# Patient Record
Sex: MALE | Race: WHITE | URBAN - METROPOLITAN AREA
[De-identification: names, ages, dates, MRNs, and addresses within clinical notes are randomized per-mention and may not be internally consistent; named-entity substitution may affect disease eponyms.]

---

## 2023-11-10 ENCOUNTER — PHARMACY VISIT (OUTPATIENT)
Dept: PHARMACY | Facility: CLINIC | Age: 38
End: 2023-11-10
Payer: MEDICAID

## 2023-11-10 PROCEDURE — RXMED WILLOW AMBULATORY MEDICATION CHARGE

## 2023-11-10 RX ORDER — ONDANSETRON 4 MG/1
TABLET, ORALLY DISINTEGRATING ORAL
Qty: 8 TABLET | Refills: 0 | OUTPATIENT
Start: 2023-11-10

## 2023-11-10 RX ORDER — TRAZODONE HYDROCHLORIDE 50 MG/1
TABLET ORAL
Qty: 10 TABLET | Refills: 0 | OUTPATIENT
Start: 2023-11-10

## 2023-11-10 RX ORDER — HYDROXYZINE HYDROCHLORIDE 50 MG/1
TABLET, FILM COATED ORAL
Qty: 30 TABLET | Refills: 0 | OUTPATIENT
Start: 2023-11-10

## 2023-11-12 ENCOUNTER — PHARMACY VISIT (OUTPATIENT)
Dept: PHARMACY | Facility: CLINIC | Age: 38
End: 2023-11-12
Payer: MEDICAID

## 2023-11-12 PROCEDURE — RXMED WILLOW AMBULATORY MEDICATION CHARGE

## 2023-11-12 RX ORDER — HYDROCHLOROTHIAZIDE 25 MG/1
25 TABLET ORAL DAILY
Qty: 7 TABLET | Refills: 0 | OUTPATIENT
Start: 2023-11-12

## 2024-01-11 ENCOUNTER — HOSPITAL ENCOUNTER (EMERGENCY)
Age: 39
Discharge: HOME | End: 2024-01-11
Attending: EMERGENCY MEDICINE
Payer: COMMERCIAL

## 2024-01-11 VITALS
TEMPERATURE: 97 F | WEIGHT: 315 LBS | RESPIRATION RATE: 16 BRPM | HEART RATE: 98 BPM | SYSTOLIC BLOOD PRESSURE: 175 MMHG | OXYGEN SATURATION: 94 % | DIASTOLIC BLOOD PRESSURE: 132 MMHG

## 2024-01-11 DIAGNOSIS — I10 HYPERTENSION, UNSPECIFIED TYPE: Primary | ICD-10-CM

## 2024-01-11 PROCEDURE — 99283 EMERGENCY DEPT VISIT LOW MDM: CPT

## 2024-01-11 PROCEDURE — 93005 ELECTROCARDIOGRAM TRACING: CPT

## 2024-01-11 ASSESSMENT — ENCOUNTER SYMPTOMS
NAUSEA: 0
SHORTNESS OF BREATH: 0
VOMITING: 0
ABDOMINAL PAIN: 0

## 2024-01-11 NOTE — ED NOTES
PCP appt scheduled for pt. Spoke with [facility] and they are okay with any day and time as long as it is during business hours so a  can take him. Called housing and they are going to come and pick pt up.

## 2024-01-11 NOTE — DISCHARGE INSTRUCTIONS
You were seen in the emergency department for high blood pressure.  Given that you are not having any symptoms, there is no indication to drop your blood pressure.  There is a risk of precipitating a stroke if your blood pressure is lowered quickly.  Continue taking your medications as prescribed.  Take your medications at the same time every day.  Clonidine specifically can cause rebound high blood pressure if not taken at the same time every day.    Return to the emergency department if you are having any chest pain, shortness of breath, vision changes, headache, new weakness or numbness, or any other acute medical concern.

## 2024-01-11 NOTE — ED PROVIDER NOTES
Note Started: 4:26 PM EST         [facility]     Emergency Department     Faculty Attestation    I performed a history and physical examination of the patient and discussed management with the resident. I reviewed the resident’s note and agree with the documented findings and plan of care. Any areas of disagreement are noted on the chart. I was personally present for the key portions of any procedures. I have documented in the chart those procedures where I was not present during the key portions. I have reviewed the emergency nurses triage note. I agree with the chief complaint, past medical history, past surgical history, allergies, medications, social and family history as documented unless otherwise noted below.        For Physician Assistant/ Nurse Practitioner cases/documentation I have personally evaluated this patient and have completed at least one if not all key elements of the E/M (history, physical exam, and MDM). Additional findings are as noted.  I have personally seen and evaluated the patient.  I find the patient's history and physical exam are consistent with the NP/PA documentation.  I agree with the care provided, treatment rendered, disposition and follow-up plan.    38-year-old male sent in from [facility] rehabilitation for hypertension.  Had blood pressure taken today and was significantly elevated.  Skipped his clonidine dose yesterday.  Denies any symptoms including no chest pain, no shortness of breath, no exertional fatigue, no headache, no vision changes, no floaters.  He does not have a PCP currently.  He has been on losartan and clonidine intermittently.    Exam:  General : Laying on the bed, awake, alert, and in no acute distress  CV : normal rate and regular rhythm  Lungs : Breathing comfortably on room air with no tachypnea, hypoxia, or increased work of breathing    DDx: Asymptomatic hypertension.  Will not acutely lower blood pressure, as this  will increase his risk of stroke rather than lowering him over a protracted period of time.  Will collaborate with social work to get him set up for a primary care appointment.  Encouraged him to take his clonidine and losartan as prescribed.  Patient is already taken doses today.  Follow-up as needed.  Encouraged him to return if he is at all symptomatic for further workup.    Medical Decision Making  Amount and/or Complexity of Data Reviewed  ECG/medicine tests: ordered.        EKG: Interpreted by myself: Sinus rhythm with intermittent PVCs.  97 bpm.  Normal axis.  Normal intervals.  No ST segment elevation or depression.  No T wave inversions.  Nonspecific EKG.    Mela Crane MD   Attending Emergency Physician    (Please note that portions of this note were completed with a voice recognition program. Efforts were made to edit the dictations but occasionally words are mis-transcribed.)           Mela Crane MD  01/11/24 3684

## 2024-01-11 NOTE — ED PROVIDER NOTES
[facility] ED  Emergency Department Encounter  Emergency Medicine Resident     Pt Name:Kelvin Cruz  MRN: [id number]  Birthdate 1985  Date of evaluation: 1/11/24  PCP:  No primary care provider on file.  Note Started: 3:54 PM EST      CHIEF COMPLAINT       Chief Complaint   Patient presents with   • Hypertension       HISTORY OF PRESENT ILLNESS  (Location/Symptom, Timing/Onset, Context/Setting, Quality, Duration, Modifying Factors, Severity.)      Kelvin Cruz is a 38 y.o. male who presents with asymptomatic hypertension.  Patient is presenting from [facility] rehab facility, did have his blood pressure measured at their and he was found to be hypertensive.  Patient is denying any chest pain, shortness of breath, headache, vision changes, new weakness or numbness, abdominal pain.  Patient does have a history of hypertension, is on clonidine and losartan and however he endorses poor medical compliance.  States he does not have a primary care doctor, states he is meant to be on ADHD meds however cannot recall what this medication is. he denies anticoagulation.  He denies history of known CAD, denies history of previous DVT/PE.    Patient denies any trauma, denies any wounds    PAST MEDICAL / SURGICAL / SOCIAL / FAMILY HISTORY      has no past medical history on file. ADHD     has no past surgical history on file.    Social History     Socioeconomic History   • Marital status: Single     Spouse name: Not on file   • Number of children: Not on file   • Years of education: Not on file   • Highest education level: Not on file   Occupational History   • Not on file   Tobacco Use   • Smoking status: Not on file   • Smokeless tobacco: Not on file   Substance and Sexual Activity   • Alcohol use: Not on file   • Drug use: Not on file   • Sexual activity: Not on file   Other Topics Concern   • Not on file   Social History Narrative   • Not on file     Social Determinants of Health     Financial Resource Strain: Not  Discussed with social work, will set up PCP follow-up [KJ]   6200 Patient discharged with PCP follow-up, return precautions, and instructions to take his clonidine at the same time every day. [KJ]      ED Course User Index  [KJ] Basilio Cruz MD     IMAGING:  No orders to display       MEDICATIONS GIVEN TO PATIENT THIS ENCOUNTER:  No orders of the defined types were placed in this encounter.    PROCEDURES:  Procedures     CONSULTS:  None    FINAL IMPRESSION      1. Hypertension, unspecified type          DISPOSITION / PLAN     DISPOSITION        PATIENT REFERRED TO:  [facility] ED  [address] [address]  [phone]  Go to   If symptoms worsen      DISCHARGE MEDICATIONS:  New Prescriptions    No medications on file       Basilio Cruz MD  Emergency Medicine Resident    (Please note that portions of this note were completed with a voice recognition program.  Efforts were made to edit the dictations but occasionally words are mis-transcribed.)

## 2024-01-11 NOTE — ED TRIAGE NOTES
Pt presents to ED room 22 ambulatory from triage stating that [facility] facility sent him due to HTN. Pt reports that they gave him clonidine for his high blood pressure. Pt is asymptomatic at this time and denies SOB, chest pain, blurred vision. Pt states he just needs clearance to go back to his rehab facility. Pt resting on stretcher, NAD noted, RR even and non labored. Call light placed within reach.

## 2024-01-13 LAB
EKG ATRIAL RATE: 97 BPM
EKG P AXIS: 58 DEGREES
EKG P-R INTERVAL: 182 MS
EKG Q-T INTERVAL: 390 MS
EKG QRS DURATION: 128 MS
EKG QTC CALCULATION (BAZETT): 495 MS
EKG R AXIS: 66 DEGREES
EKG T AXIS: 42 DEGREES
EKG VENTRICULAR RATE: 97 BPM
